# Patient Record
Sex: FEMALE | Race: WHITE | NOT HISPANIC OR LATINO | Employment: STUDENT | URBAN - METROPOLITAN AREA
[De-identification: names, ages, dates, MRNs, and addresses within clinical notes are randomized per-mention and may not be internally consistent; named-entity substitution may affect disease eponyms.]

---

## 2017-05-25 ENCOUNTER — ALLSCRIPTS OFFICE VISIT (OUTPATIENT)
Dept: OTHER | Facility: OTHER | Age: 22
End: 2017-05-25

## 2017-05-25 ENCOUNTER — TRANSCRIBE ORDERS (OUTPATIENT)
Dept: ADMINISTRATIVE | Facility: HOSPITAL | Age: 22
End: 2017-05-25

## 2017-05-25 DIAGNOSIS — M25.552 PAIN IN LEFT HIP: ICD-10-CM

## 2017-05-25 DIAGNOSIS — M25.552 LEFT HIP PAIN: Primary | ICD-10-CM

## 2017-06-06 ENCOUNTER — HOSPITAL ENCOUNTER (OUTPATIENT)
Dept: RADIOLOGY | Facility: HOSPITAL | Age: 22
Discharge: HOME | End: 2017-06-06
Payer: COMMERCIAL

## 2017-06-06 DIAGNOSIS — M25.552 LEFT HIP PAIN: ICD-10-CM

## 2017-06-06 DIAGNOSIS — M25.552 PAIN IN LEFT HIP: ICD-10-CM

## 2017-06-06 PROCEDURE — A9585 GADOBUTROL INJECTION: HCPCS | Performed by: RADIOLOGY

## 2017-06-06 PROCEDURE — 73722 MRI JOINT OF LWR EXTR W/DYE: CPT

## 2017-06-06 PROCEDURE — 27095 INJECTION FOR HIP X-RAY: CPT

## 2017-06-06 PROCEDURE — A9585 GADOBUTROL INJECTION: HCPCS | Performed by: PHYSICIAN ASSISTANT

## 2017-06-06 PROCEDURE — 77002 NEEDLE LOCALIZATION BY XRAY: CPT

## 2017-06-06 RX ORDER — LIDOCAINE HYDROCHLORIDE 10 MG/ML
20 INJECTION, SOLUTION INFILTRATION; PERINEURAL
Status: COMPLETED | OUTPATIENT
Start: 2017-06-06 | End: 2017-06-06

## 2017-06-06 RX ORDER — 0.9 % SODIUM CHLORIDE 0.9 %
50 VIAL (ML) INJECTION
Status: COMPLETED | OUTPATIENT
Start: 2017-06-06 | End: 2017-06-06

## 2017-06-06 RX ORDER — SODIUM BICARBONATE 42 MG/ML
5 INJECTION, SOLUTION INTRAVENOUS
Status: COMPLETED | OUTPATIENT
Start: 2017-06-06 | End: 2017-06-06

## 2017-06-06 RX ADMIN — IOHEXOL 3 ML: 300 INJECTION, SOLUTION INTRAVENOUS at 15:15

## 2017-06-06 RX ADMIN — SODIUM BICARBONATE 1 ML: 42 SOLUTION INTRAVENOUS at 15:15

## 2017-06-06 RX ADMIN — LIDOCAINE HYDROCHLORIDE 2 ML: 10 INJECTION, SOLUTION INFILTRATION; PERINEURAL at 15:15

## 2017-06-06 RX ADMIN — GADOBUTROL 0.2 ML: 604.72 INJECTION INTRAVENOUS at 15:15

## 2017-06-06 RX ADMIN — GADOBUTROL 0.1 ML: 604.72 INJECTION INTRAVENOUS at 03:15

## 2017-06-06 RX ADMIN — SODIUM CHLORIDE 50 ML: 9 INJECTION, SOLUTION INTRAMUSCULAR; INTRAVENOUS; SUBCUTANEOUS at 15:15

## 2017-06-21 ENCOUNTER — ALLSCRIPTS OFFICE VISIT (OUTPATIENT)
Dept: OTHER | Facility: OTHER | Age: 22
End: 2017-06-21

## 2017-06-28 ENCOUNTER — GENERIC CONVERSION - ENCOUNTER (OUTPATIENT)
Dept: OTHER | Facility: OTHER | Age: 22
End: 2017-06-28

## 2017-07-18 ENCOUNTER — ALLSCRIPTS OFFICE VISIT (OUTPATIENT)
Dept: OTHER | Facility: OTHER | Age: 22
End: 2017-07-18

## 2017-08-08 ENCOUNTER — ALLSCRIPTS OFFICE VISIT (OUTPATIENT)
Dept: OTHER | Facility: OTHER | Age: 22
End: 2017-08-08

## 2017-08-15 RX ORDER — LEVOTHYROXINE SODIUM 0.03 MG/1
25 TABLET ORAL
COMMUNITY
Start: 2016-06-07

## 2017-08-15 RX ORDER — DROSPIRENONE/ETHINYL ESTRADIOL/LEVOMEFOLATE CALCIUM AND LEVOMEFOLATE CALCIUM 3-0.03(21)
KIT ORAL
COMMUNITY
End: 2021-01-04 | Stop reason: SDUPTHER

## 2017-08-17 ENCOUNTER — ANESTHESIA (OUTPATIENT)
Dept: PERIOP | Facility: HOSPITAL | Age: 22
End: 2017-08-17
Payer: COMMERCIAL

## 2017-08-17 ENCOUNTER — HOSPITAL ENCOUNTER (OUTPATIENT)
Facility: HOSPITAL | Age: 22
Setting detail: OUTPATIENT SURGERY
Discharge: HOME | End: 2017-08-17
Attending: ORTHOPAEDIC SURGERY | Admitting: ORTHOPAEDIC SURGERY
Payer: COMMERCIAL

## 2017-08-17 ENCOUNTER — APPOINTMENT (OUTPATIENT)
Dept: RADIOLOGY | Facility: HOSPITAL | Age: 22
End: 2017-08-17
Payer: COMMERCIAL

## 2017-08-17 ENCOUNTER — ANESTHESIA EVENT (OUTPATIENT)
Dept: PERIOP | Facility: HOSPITAL | Age: 22
End: 2017-08-17
Payer: COMMERCIAL

## 2017-08-17 VITALS
HEIGHT: 65 IN | OXYGEN SATURATION: 98 % | WEIGHT: 125 LBS | DIASTOLIC BLOOD PRESSURE: 61 MMHG | SYSTOLIC BLOOD PRESSURE: 111 MMHG | TEMPERATURE: 97.2 F | RESPIRATION RATE: 16 BRPM | BODY MASS INDEX: 20.83 KG/M2 | HEART RATE: 82 BPM

## 2017-08-17 LAB — EXT PREGNANCY TEST URINE: NEGATIVE

## 2017-08-17 PROCEDURE — 73501 X-RAY EXAM HIP UNI 1 VIEW: CPT

## 2017-08-17 PROCEDURE — 81025 URINE PREGNANCY TEST: CPT | Performed by: ORTHOPAEDIC SURGERY

## 2017-08-17 PROCEDURE — C1713 ANCHOR/SCREW BN/BN,TIS/BN: HCPCS | Performed by: ORTHOPAEDIC SURGERY

## 2017-08-17 DEVICE — OSTEORAPTOR 2.3 SUTURE ANCHOR WITH                                    ONE ULTRABRAID NO.2 SUTURE COBRAID BLACK
Type: IMPLANTABLE DEVICE | Site: HIP | Status: FUNCTIONAL
Brand: OSTEORAPTOR

## 2017-08-17 RX ORDER — KETAMINE HYDROCHLORIDE 50 MG/ML
INJECTION, SOLUTION, CONCENTRATE INTRAMUSCULAR; INTRAVENOUS AS NEEDED
Status: DISCONTINUED | OUTPATIENT
Start: 2017-08-17 | End: 2017-08-17 | Stop reason: SURG

## 2017-08-17 RX ORDER — ACETAMINOPHEN 325 MG/1
650 TABLET ORAL EVERY 4 HOURS PRN
Status: DISCONTINUED | OUTPATIENT
Start: 2017-08-17 | End: 2017-08-17 | Stop reason: HOSPADM

## 2017-08-17 RX ORDER — ONDANSETRON 2 MG/ML
4 INJECTION INTRAMUSCULAR; INTRAVENOUS EVERY 6 HOURS PRN
Status: DISCONTINUED | OUTPATIENT
Start: 2017-08-17 | End: 2017-08-17 | Stop reason: HOSPADM

## 2017-08-17 RX ORDER — KETOROLAC TROMETHAMINE 30 MG/ML
INJECTION, SOLUTION INTRAMUSCULAR; INTRAVENOUS AS NEEDED
Status: DISCONTINUED | OUTPATIENT
Start: 2017-08-17 | End: 2017-08-17 | Stop reason: SURG

## 2017-08-17 RX ORDER — ONDANSETRON 2 MG/ML
INJECTION INTRAMUSCULAR; INTRAVENOUS AS NEEDED
Status: DISCONTINUED | OUTPATIENT
Start: 2017-08-17 | End: 2017-08-17 | Stop reason: SURG

## 2017-08-17 RX ORDER — ACETAMINOPHEN 325 MG/1
975 TABLET ORAL ONCE
Status: COMPLETED | OUTPATIENT
Start: 2017-08-17 | End: 2017-08-17

## 2017-08-17 RX ORDER — OXYCODONE HYDROCHLORIDE AND ACETAMINOPHEN 5; 325 MG/1; MG/1
2 TABLET ORAL EVERY 4 HOURS PRN
Status: DISCONTINUED | OUTPATIENT
Start: 2017-08-17 | End: 2017-08-17 | Stop reason: HOSPADM

## 2017-08-17 RX ORDER — LIDOCAINE HYDROCHLORIDE 10 MG/ML
INJECTION, SOLUTION INFILTRATION; PERINEURAL AS NEEDED
Status: DISCONTINUED | OUTPATIENT
Start: 2017-08-17 | End: 2017-08-17 | Stop reason: SURG

## 2017-08-17 RX ORDER — METOCLOPRAMIDE HYDROCHLORIDE 5 MG/ML
10 INJECTION INTRAMUSCULAR; INTRAVENOUS ONCE AS NEEDED
Status: DISCONTINUED | OUTPATIENT
Start: 2017-08-17 | End: 2017-08-17 | Stop reason: HOSPADM

## 2017-08-17 RX ORDER — FENTANYL CITRATE/PF 50 MCG/ML
50 SYRINGE (ML) INJECTION
Status: DISCONTINUED | OUTPATIENT
Start: 2017-08-17 | End: 2017-08-17 | Stop reason: HOSPADM

## 2017-08-17 RX ORDER — FENTANYL CITRATE 50 UG/ML
INJECTION, SOLUTION INTRAMUSCULAR; INTRAVENOUS AS NEEDED
Status: DISCONTINUED | OUTPATIENT
Start: 2017-08-17 | End: 2017-08-17 | Stop reason: SURG

## 2017-08-17 RX ORDER — PREGABALIN 100 MG/1
100 CAPSULE ORAL ONCE
Status: COMPLETED | OUTPATIENT
Start: 2017-08-17 | End: 2017-08-17

## 2017-08-17 RX ORDER — PROMETHAZINE HYDROCHLORIDE 25 MG/ML
25 INJECTION, SOLUTION INTRAMUSCULAR; INTRAVENOUS ONCE AS NEEDED
Status: DISCONTINUED | OUTPATIENT
Start: 2017-08-17 | End: 2017-08-17 | Stop reason: HOSPADM

## 2017-08-17 RX ORDER — SODIUM CHLORIDE, SODIUM LACTATE, POTASSIUM CHLORIDE, CALCIUM CHLORIDE 600; 310; 30; 20 MG/100ML; MG/100ML; MG/100ML; MG/100ML
INJECTION, SOLUTION INTRAVENOUS CONTINUOUS PRN
Status: DISCONTINUED | OUTPATIENT
Start: 2017-08-17 | End: 2017-08-17 | Stop reason: SURG

## 2017-08-17 RX ORDER — DEXMEDETOMIDINE HYDROCHLORIDE 100 UG/ML
INJECTION, SOLUTION INTRAVENOUS AS NEEDED
Status: DISCONTINUED | OUTPATIENT
Start: 2017-08-17 | End: 2017-08-17 | Stop reason: SURG

## 2017-08-17 RX ORDER — SODIUM CHLORIDE, SODIUM LACTATE, POTASSIUM CHLORIDE, CALCIUM CHLORIDE 600; 310; 30; 20 MG/100ML; MG/100ML; MG/100ML; MG/100ML
100 INJECTION, SOLUTION INTRAVENOUS CONTINUOUS
Status: DISCONTINUED | OUTPATIENT
Start: 2017-08-17 | End: 2017-08-17 | Stop reason: HOSPADM

## 2017-08-17 RX ORDER — OXYCODONE HYDROCHLORIDE AND ACETAMINOPHEN 5; 325 MG/1; MG/1
TABLET ORAL
Qty: 60 TABLET | Refills: 0 | Status: SHIPPED | OUTPATIENT
Start: 2017-08-17

## 2017-08-17 RX ORDER — MIDAZOLAM HYDROCHLORIDE 1 MG/ML
INJECTION INTRAMUSCULAR; INTRAVENOUS AS NEEDED
Status: DISCONTINUED | OUTPATIENT
Start: 2017-08-17 | End: 2017-08-17 | Stop reason: SURG

## 2017-08-17 RX ORDER — DIPHENHYDRAMINE HYDROCHLORIDE 50 MG/ML
12.5 INJECTION INTRAMUSCULAR; INTRAVENOUS ONCE AS NEEDED
Status: DISCONTINUED | OUTPATIENT
Start: 2017-08-17 | End: 2017-08-17 | Stop reason: HOSPADM

## 2017-08-17 RX ORDER — EPHEDRINE SULFATE 50 MG/ML
INJECTION, SOLUTION INTRAVENOUS AS NEEDED
Status: DISCONTINUED | OUTPATIENT
Start: 2017-08-17 | End: 2017-08-17 | Stop reason: SURG

## 2017-08-17 RX ORDER — MEPERIDINE HYDROCHLORIDE 25 MG/ML
12.5 INJECTION INTRAMUSCULAR; INTRAVENOUS; SUBCUTANEOUS ONCE AS NEEDED
Status: DISCONTINUED | OUTPATIENT
Start: 2017-08-17 | End: 2017-08-17 | Stop reason: HOSPADM

## 2017-08-17 RX ORDER — MORPHINE SULFATE 2 MG/ML
2 INJECTION, SOLUTION INTRAMUSCULAR; INTRAVENOUS EVERY 2 HOUR PRN
Status: DISCONTINUED | OUTPATIENT
Start: 2017-08-17 | End: 2017-08-17 | Stop reason: HOSPADM

## 2017-08-17 RX ORDER — PROPOFOL 10 MG/ML
INJECTION, EMULSION INTRAVENOUS AS NEEDED
Status: DISCONTINUED | OUTPATIENT
Start: 2017-08-17 | End: 2017-08-17 | Stop reason: SURG

## 2017-08-17 RX ADMIN — DEXAMETHASONE SODIUM PHOSPHATE 10 MG: 10 INJECTION INTRAMUSCULAR; INTRAVENOUS at 11:25

## 2017-08-17 RX ADMIN — CEFAZOLIN SODIUM 1000 MG: 1 SOLUTION INTRAVENOUS at 11:25

## 2017-08-17 RX ADMIN — DEXMEDETOMIDINE HYDROCHLORIDE 4 MCG: 100 INJECTION, SOLUTION INTRAVENOUS at 11:29

## 2017-08-17 RX ADMIN — DEXMEDETOMIDINE HYDROCHLORIDE 4 MCG: 100 INJECTION, SOLUTION INTRAVENOUS at 11:37

## 2017-08-17 RX ADMIN — PROPOFOL 200 MG: 10 INJECTION, EMULSION INTRAVENOUS at 11:18

## 2017-08-17 RX ADMIN — EPHEDRINE SULFATE 10 MG: 50 INJECTION, SOLUTION INTRAMUSCULAR; INTRAVENOUS; SUBCUTANEOUS at 11:55

## 2017-08-17 RX ADMIN — PREGABALIN 100 MG: 100 CAPSULE ORAL at 10:43

## 2017-08-17 RX ADMIN — DEXMEDETOMIDINE HYDROCHLORIDE 4 MCG: 100 INJECTION, SOLUTION INTRAVENOUS at 11:41

## 2017-08-17 RX ADMIN — FENTANYL CITRATE 50 MCG: 50 INJECTION INTRAMUSCULAR; INTRAVENOUS at 13:02

## 2017-08-17 RX ADMIN — LIDOCAINE HYDROCHLORIDE 50 MG: 10 INJECTION, SOLUTION INFILTRATION; PERINEURAL at 11:18

## 2017-08-17 RX ADMIN — DEXMEDETOMIDINE HYDROCHLORIDE 4 MCG: 100 INJECTION, SOLUTION INTRAVENOUS at 11:32

## 2017-08-17 RX ADMIN — OXYCODONE HYDROCHLORIDE AND ACETAMINOPHEN 2 TABLET: 5; 325 TABLET ORAL at 13:22

## 2017-08-17 RX ADMIN — MIDAZOLAM HYDROCHLORIDE 2 MG: 1 INJECTION, SOLUTION INTRAMUSCULAR; INTRAVENOUS at 10:55

## 2017-08-17 RX ADMIN — ONDANSETRON 4 MG: 2 INJECTION INTRAMUSCULAR; INTRAVENOUS at 12:14

## 2017-08-17 RX ADMIN — DEXMEDETOMIDINE HYDROCHLORIDE 4 MCG: 100 INJECTION, SOLUTION INTRAVENOUS at 11:31

## 2017-08-17 RX ADMIN — DEXMEDETOMIDINE HYDROCHLORIDE 4 MCG: 100 INJECTION, SOLUTION INTRAVENOUS at 11:27

## 2017-08-17 RX ADMIN — KETAMINE HYDROCHLORIDE 30 MG: 50 INJECTION INTRAMUSCULAR; INTRAVENOUS at 11:30

## 2017-08-17 RX ADMIN — FENTANYL CITRATE 25 MCG: 50 INJECTION INTRAMUSCULAR; INTRAVENOUS at 11:58

## 2017-08-17 RX ADMIN — SODIUM CHLORIDE, SODIUM LACTATE, POTASSIUM CHLORIDE, AND CALCIUM CHLORIDE: .6; .31; .03; .02 INJECTION, SOLUTION INTRAVENOUS at 10:40

## 2017-08-17 RX ADMIN — ACETAMINOPHEN 975 MG: 325 TABLET ORAL at 10:43

## 2017-08-17 RX ADMIN — DEXMEDETOMIDINE HYDROCHLORIDE 4 MCG: 100 INJECTION, SOLUTION INTRAVENOUS at 11:35

## 2017-08-17 RX ADMIN — KETOROLAC TROMETHAMINE 30 MG: 30 INJECTION, SOLUTION INTRAMUSCULAR at 12:18

## 2017-08-29 ENCOUNTER — ALLSCRIPTS OFFICE VISIT (OUTPATIENT)
Dept: OTHER | Facility: OTHER | Age: 22
End: 2017-08-29

## 2017-08-31 DIAGNOSIS — S73.192D OTHER SPRAIN OF LEFT HIP, SUBSEQUENT ENCOUNTER: ICD-10-CM

## 2017-09-01 ENCOUNTER — GENERIC CONVERSION - ENCOUNTER (OUTPATIENT)
Dept: OTHER | Facility: OTHER | Age: 22
End: 2017-09-01

## 2017-09-25 ENCOUNTER — GENERIC CONVERSION - ENCOUNTER (OUTPATIENT)
Dept: OTHER | Facility: OTHER | Age: 22
End: 2017-09-25

## 2017-09-27 ENCOUNTER — GENERIC CONVERSION - ENCOUNTER (OUTPATIENT)
Dept: OTHER | Facility: OTHER | Age: 22
End: 2017-09-27

## 2017-10-23 ENCOUNTER — GENERIC CONVERSION - ENCOUNTER (OUTPATIENT)
Dept: OTHER | Facility: OTHER | Age: 22
End: 2017-10-23

## 2017-11-24 ENCOUNTER — ALLSCRIPTS OFFICE VISIT (OUTPATIENT)
Dept: OTHER | Facility: OTHER | Age: 22
End: 2017-11-24

## 2017-11-25 NOTE — PROGRESS NOTES
Assessment    1  Tear of left acetabular labrum, subsequent encounter (K48 05,173 7) (O31 216B)    Discussion/Summary    Left hip arthroscopic labral repair 8/17/17  resume full activity as tolerated, modify as needed  Follow-up in 4-6 weeks for clearance to resume lacrosse in January if she continues to do well  Chief Complaint  1  Hip Pain    Post-Op  HPI: Follow-up after left hip arthroscopic labral repair 8/17/17  Patient reports no pain at this time  She has completed physical therapy and recently resumed running 1 mi with no significant issues  She has not attempted any sprinting or cutting activities but has been doing strengthening exercises with steady progress  Active Problems  1  Tear of left acetabular labrum, subsequent encounter (V58 89,843 8) (B90 242W)    Social History   · Never a smoker    Current Meds   1  Safyral TABS (Drospiren-Eth Estrad-Levomefol) Recorded   2  Synthroid TABS (Levothyroxine Sodium) Recorded    Allergies  1  Gentamicin Sulfate OINT   2  Sulfacetamide Sodium OINT    Vitals   Recorded: [id number] 09:08AM   Heart Rate 82   Systolic 380   Diastolic 75   Height 5 ft 3 in   Weight 126 lb    BMI Calculated 22 32   BSA Calculated 1 59       Physical Exam  Left hip:  No gross deformity  Full range of motion  Impingement test is negative  REID test is negative  Straight leg raise against resistance is negative  Passive supine rotation test is negative  No lower extremity edema  Mood and affect are appropriate        Signatures   Electronically signed by : SOFIE Melchor ; Nov 24 2017  9:21AM EST                       (Author)

## 2018-01-03 ENCOUNTER — ALLSCRIPTS OFFICE VISIT (OUTPATIENT)
Dept: OTHER | Facility: OTHER | Age: 23
End: 2018-01-03

## 2018-01-04 NOTE — PROGRESS NOTES
Assessment   1  Tear of left acetabular labrum, subsequent encounter (V58 [id number] 8) (S73 256D)    Discussion/Summary      Cleared to return to sports without restrictions  as needed  Chief Complaint   1  Hip Pain    History of Present Illness   HPI: Follow-up left hip after arthroscopic labral repair follow-up left hip after arthroscopic labral repair 8/17/17  Patient reports no pain at this time  She notes soreness occasionally her workouts  She has transition to home exercise program at this point  Active Problems   1  Tear of left acetabular labrum, subsequent encounter (V58 89,843 8) (F89 519Z)    Social History    · Never a smoker    Current Meds    1  Safyral TABS (Drospiren-Eth Estrad-Levomefol) Recorded   2  Synthroid TABS (Levothyroxine Sodium) Recorded    Allergies   1  Gentamicin Sulfate OINT   2  Sulfacetamide Sodium OINT    Vitals    Recorded: [id number] 08:32AM   Heart Rate 78   Systolic 966   Diastolic 76   Height 5 ft 3 in   Weight 125 lb    BMI Calculated 22 14   BSA Calculated 1 59     Physical Exam   Left hip:  [No gross deformity]  [Nontender to palpation]  Range of motion is [symmetric bilaterally]  FADDIR test is [negative]  REID test is [negative]  Passive supine rotation test is [negative]  Straight leg raise against resistance is [negative]  No lower extremity edema  Mood and affect are appropriate           Signatures    Electronically signed by : SOFIE Slater ; Joni  3 2018  8:49AM EST                       (Author)

## 2018-01-13 VITALS
WEIGHT: 127.38 LBS | DIASTOLIC BLOOD PRESSURE: 75 MMHG | HEIGHT: 63 IN | SYSTOLIC BLOOD PRESSURE: 119 MMHG | BODY MASS INDEX: 22.57 KG/M2 | HEART RATE: 116 BPM

## 2018-01-14 VITALS
HEART RATE: 64 BPM | WEIGHT: 128.13 LBS | SYSTOLIC BLOOD PRESSURE: 104 MMHG | DIASTOLIC BLOOD PRESSURE: 70 MMHG | RESPIRATION RATE: 18 BRPM

## 2018-01-14 VITALS
HEIGHT: 63 IN | DIASTOLIC BLOOD PRESSURE: 75 MMHG | WEIGHT: 128.13 LBS | BODY MASS INDEX: 22.7 KG/M2 | RESPIRATION RATE: 18 BRPM | HEART RATE: 63 BPM | SYSTOLIC BLOOD PRESSURE: 118 MMHG

## 2018-01-14 VITALS
DIASTOLIC BLOOD PRESSURE: 75 MMHG | SYSTOLIC BLOOD PRESSURE: 118 MMHG | HEART RATE: 82 BPM | BODY MASS INDEX: 22.32 KG/M2 | WEIGHT: 126 LBS | HEIGHT: 63 IN

## 2018-01-14 VITALS
SYSTOLIC BLOOD PRESSURE: 108 MMHG | HEART RATE: 69 BPM | BODY MASS INDEX: 22.57 KG/M2 | WEIGHT: 127.38 LBS | DIASTOLIC BLOOD PRESSURE: 69 MMHG | HEIGHT: 63 IN

## 2018-01-15 VITALS
WEIGHT: 124 LBS | DIASTOLIC BLOOD PRESSURE: 77 MMHG | SYSTOLIC BLOOD PRESSURE: 122 MMHG | BODY MASS INDEX: 21.97 KG/M2 | HEART RATE: 84 BPM | HEIGHT: 63 IN

## 2018-01-22 VITALS
WEIGHT: 127 LBS | DIASTOLIC BLOOD PRESSURE: 78 MMHG | HEART RATE: 86 BPM | BODY MASS INDEX: 22.5 KG/M2 | SYSTOLIC BLOOD PRESSURE: 115 MMHG | HEIGHT: 63 IN

## 2018-01-23 VITALS
WEIGHT: 125 LBS | HEIGHT: 63 IN | BODY MASS INDEX: 22.15 KG/M2 | HEART RATE: 78 BPM | DIASTOLIC BLOOD PRESSURE: 76 MMHG | SYSTOLIC BLOOD PRESSURE: 123 MMHG

## 2018-01-23 NOTE — MISCELLANEOUS
Message  Return to work or school:   Jose Heart is under my professional care  She was seen in my office on 1/3/18  She is cleared to return to sports without restrictions          Hieu Chaves MD       Signatures   Electronically signed by : SOFIE Horne ; Joni  3 2018  8:50AM EST                       (Author)

## 2020-12-30 ENCOUNTER — TELEPHONE (OUTPATIENT)
Dept: OBGYN CLINIC | Facility: CLINIC | Age: 25
End: 2020-12-30

## 2021-01-04 DIAGNOSIS — Z30.41 ORAL CONTRACEPTIVE PILL SURVEILLANCE: Primary | ICD-10-CM

## 2021-01-04 RX ORDER — DROSPIRENONE/ETHINYL ESTRADIOL/LEVOMEFOLATE CALCIUM AND LEVOMEFOLATE CALCIUM 3-0.03(21)
1 KIT ORAL DAILY
Qty: 28 TABLET | Refills: 2 | Status: SHIPPED | OUTPATIENT
Start: 2021-01-04

## 2021-01-04 NOTE — TELEPHONE ENCOUNTER
Pt returned my call in regards to her birth control and states she does need refills, pended 3 months for her  She is currently in [place] and trying to find a GYN there

## 2025-04-09 ENCOUNTER — HOSPITAL ENCOUNTER (OUTPATIENT)
Facility: CLINIC | Age: 30
Discharge: HOME | End: 2025-04-09
Attending: FAMILY MEDICINE
Payer: COMMERCIAL

## 2025-04-09 VITALS
DIASTOLIC BLOOD PRESSURE: 56 MMHG | HEART RATE: 81 BPM | SYSTOLIC BLOOD PRESSURE: 110 MMHG | OXYGEN SATURATION: 98 % | TEMPERATURE: 97.9 F | WEIGHT: 140 LBS

## 2025-04-09 DIAGNOSIS — R19.7 DIARRHEA, UNSPECIFIED TYPE: ICD-10-CM

## 2025-04-09 DIAGNOSIS — J02.9 ACUTE PHARYNGITIS, UNSPECIFIED ETIOLOGY: Primary | ICD-10-CM

## 2025-04-09 LAB
EXPIRATION DATE: NORMAL
Lab: NORMAL
POCT MANUFACTURER: NORMAL
S PYO AG THROAT QL: NEGATIVE

## 2025-04-09 PROCEDURE — 99203 OFFICE O/P NEW LOW 30 MIN: CPT

## 2025-04-09 PROCEDURE — 87880 STREP A ASSAY W/OPTIC: CPT

## 2025-04-09 PROCEDURE — S9088 SERVICES PROVIDED IN URGENT: HCPCS

## 2025-04-09 ASSESSMENT — ENCOUNTER SYMPTOMS
LIGHT-HEADEDNESS: 0
WHEEZING: 0
FEVER: 0
HEADACHES: 0
CONSTIPATION: 0
SHORTNESS OF BREATH: 0
SORE THROAT: 1
DIZZINESS: 0
CHEST TIGHTNESS: 0
BLOOD IN STOOL: 0
ABDOMINAL PAIN: 0
DIARRHEA: 1
MYALGIAS: 0
COUGH: 0
PALPITATIONS: 0
CHILLS: 1
NAUSEA: 0
VOMITING: 0
FATIGUE: 0
RHINORRHEA: 0

## 2025-04-09 NOTE — ED ATTESTATION NOTE
I was immediately available to provide supervision and direction for the care of the patient.  I agree with the plan based on the documented history and physical findings.       Campos Maravilla MD  04/09/25 8748

## 2025-04-09 NOTE — ED PROVIDER NOTES
Emergency Medicine Note  HPI   HISTORY OF PRESENT ILLNESS     Chief complaint: diarrhea  Onset: 4-5 days  Pt denies changes in food/diet. Had some cramping on day 1, since resolved.   Characteristics: pt can keep food/fluids down.   Accompanying symptoms: some bloating  Aggravating Factors: eating  Relieving Factors: none identified  Treatments attempted: none  Denies fever, N/V, blood in stool, black/tarry stool, constipation, rectal pain, acid reflux, sour/upset stomach, back pain, abdominal pain.  Hx thyroidectomy, has been on current levothyroxine dose x 2 months.     Chief complaint: sore throat  Onset: x 1 day  Aggravating Factors: none identified  Relieving Factors: cough drops  Treatments attempted: lozenges, tylenol w/ minimal relief  Denies trismus, difficulty/trouble swallowing, pain on one side of the throat, drooling, hoarseness or vocal changes, fever, cough.              Patient History   PAST HISTORY     Reviewed from Nursing Triage:  Tobacco  Allergies  Meds  Problems  Med Hx  Surg Hx  Fam Hx  Soc   Hx      History reviewed. No pertinent past medical history.    No past surgical history on file.    No family history on file.           Review of Systems   REVIEW OF SYSTEMS     Review of Systems   Constitutional:  Positive for chills. Negative for fatigue and fever.   HENT:  Positive for sore throat. Negative for congestion, postnasal drip and rhinorrhea.    Respiratory:  Negative for cough, chest tightness, shortness of breath and wheezing.    Cardiovascular:  Negative for chest pain and palpitations.   Gastrointestinal:  Positive for diarrhea. Negative for abdominal pain, blood in stool, constipation, nausea and vomiting.   Musculoskeletal:  Negative for myalgias.   Neurological:  Negative for dizziness, light-headedness and headaches.         VITALS     ED Vitals      Date/Time Temp Pulse Resp BP SpO2 [facility]   04/09/25 0819 36.6 °C (97.9 °F) 81 -- 110/56 98 % DM                         Physical  Exam   PHYSICAL EXAM     Physical Exam  Constitutional:       General: She is not in acute distress.     Appearance: She is not toxic-appearing.   HENT:      Mouth/Throat:      Pharynx: Posterior oropharyngeal erythema present. No pharyngeal swelling or postnasal drip.      Tonsils: No tonsillar exudate. 0 on the right. 0 on the left.   Abdominal:      General: Bowel sounds are normal. There is no distension.      Palpations: Abdomen is soft.      Tenderness: There is no abdominal tenderness. There is no guarding or rebound.   Lymphadenopathy:      Cervical: No cervical adenopathy.   Neurological:      General: No focal deficit present.      Mental Status: She is alert and oriented to person, place, and time. Mental status is at baseline.   Psychiatric:         Mood and Affect: Mood normal.         Behavior: Behavior normal.         Thought Content: Thought content normal.           PROCEDURES     Procedures     DATA     Results       None                No orders to display       Scoring tools                                  ED Course & MDM   MDM / ED COURSE / CLINICAL IMPRESSION / DISPO     Medical Decision Making  Pt complains of sore throat and diarrhea  Physical exam: abd soft/nontender, throat erythematous, no lymphadenopathy/exudate  Rapid strep negative, throat culture pending.   Recommend OTC immodium, pepcid. F/u with PCP for further evaluation. ER for worsening symptoms.   Pt educated on rx meds, supportive care, f/u, and s/s red flags for emergency treatment. Pt agreeable to plan.            Clinical Impression      None                 Ilda Bonilla CRNP  04/09/25 0892

## 2025-04-09 NOTE — DISCHARGE INSTRUCTIONS
"Primary Care Provider - routine management of preventative care and medical concerns.     Please establish care with a Primary Care Provider for treatment and management of your ongoing medical concern.   Go to: [email or web address] to schedule follow up with a Primary Care Provider.   (Or Scan the QR code, then:)  Click \"Get Care\" in the upper right hand corner.  Then click Schedule Online (under Primary Care).     (For sooner appointments, you may be interested in seeking care at [facility]- [phone] or [facility]- [phone])  ------     Your rapid strep was negative.    We will send a throat culture to the lab.     Your results will also be available for you to view in your [facility] account.  If you require additional medications (antibiotics, antivirals, etc.), we will call you regarding your test results.  Please note: certain test results and certain agencies ([facility]) may take longer to result or receive results than others.  ·        Viral swabs (flu, covid, RSV): approximately 1-2 days  ·        Strep throat cultures: approximately 2-3 days  ·        Wound cultures: approximately 2-3 days  ·        Urine cultures: approximately 2-3 days    -------------------------------      Care Instructions:  Get plenty of rest to help your body fight the infection.  Drink plenty of fluids and stay well-hydrated.  Take acetaminophen (Tylenol) and/or ibuprofen (Advil or Motrin) as needed for fever and pain.  You may use a cool-mist humidifier to add moisture to the air. This may help soothe a sore throat.     Gargling with salt water may help alleviate some of your symptoms:  Mix 1 teaspoon of salt with 8 ounces of warm water. Allow salt to completely dissolve. Gargle with this solution for a few seconds, then spit. Repeat as needed. When finished, rinse your mouth with fresh water and spit. Do this several times a day as needed.  This will help clear post-nasal drip " and mucus, wash out bacteria and viruses, reduce inflammation, and relieve pain. It may also help to inhibit bacterial and viral growth.    Remember to wash your hands (with soap and water) well before and after, as this will help limit the spread of infection.    Please change your toothbrush. I recommend using an inexpensive toothbrush for a few days before using your normal-brand toothbrush.      Follow-up:  Please follow up with your primary care provider or with Urgent Care as needed if your symptoms do not improve or worsen.  Please seek emergency care at the ER if you experience (including but not limited to) a new or higher fever, a fever with a stiff neck or severe headache, new or worsening trouble/difficulty swallowing, pain that becomes much worse on one side of your throat than the other, or you are not getting better after 2 days (48 hours) after beginning an antibiotic.    ----------------------------------------------     Acid Reflux (Heartburn)  Some symptoms of acid reflux can be subtle, such as morning symptoms (sour taste, sore throat), a cough that wont go away, foreign sensation in the throat, or fullness in the chest. Chronic acid reflux can cause long-term medical problems.     Medication Regimen:  You can try Pepcid (famotidine) - one tablet by mouth at bedtime. This is available for purchase over the counter.  If you do not wish to take a medication daily for acid reflux, you can try taking Pepcid (famotidine) only as needed. Take Pepcid about 30 minutes to 1 hour before eating or drinking a known trigger.     You may also try Prilosec (omeprazole) - 1 tablet by mouth at bedtime. This is available to purchase over the counter.  If your symptoms persist after one week, please follow up with your primary care provider for further evaluation and management of acid reflux as this may indicate an underlying stomach problem. Please notify your primary care provider if you add an OTC medicine to  your daily routine.     Care instructions:  Avoid lying down after eating. Wait at least 2-3 hours after eating before lying down.  If acid reflux is a problem at night, elevate your head or use an extra pillow at night.  Eat smaller, more frequent meals.  Avoid clothing that is too tight.  Stop smoking. Smoking can contribute and/or cause acid reflux.  Avoid certain foods. Some foods that are known to cause acid reflux are fatty foods, acidic foods (like citrus fruit, tomatoes, pineapples), spicy foods, chocolate, caffeinated beverages, alcohol, carbonated drinks, and peppermint or spearmint.     Follow-up:  If acid reflux becomes a regular concern for you, keep a diary of foods that are associated with your symptoms and change your eating habits or follow-up with your primary care provider for further evaluation and management.  Please seek medical care at your nearest hospital emergency department if you experience severe pain in your chest (or have radiation into your neck, back, left arm, or jaw), have difficulty breathing, or have severe shortness of breath or wheezing.    --------------------------------------------     Diarrhea     Care Instructions:  Stay hydrated by drinking clear liquids like water, broth, or electrolyte drinks. You can do this in frequent, small volumes if you cannot tolerate drinking large volumes at this time.  Drink fluids that contain water, salt, and sugar to replace lost fluids and electrolytes. Avoid alcohol, carbonated drinks, and caffeine.  Suck on ice chips if you are unable to drink fluids.  Try to eat bland foods (like crackers, toast, or rice) in small, frequent meals.  Foods to avoid: greasy, strongly flavored, or spicy foods. Fruits and vegetables that cause gas (broccoli, beans, and peas). Carbonated drinks. High-sugar foods and drinks (fruit juice, soda, energy drinks). Dairy products if they cause gas or bloating.  Deep breathing exercises can be calming.  Monitor your  urine color. Dark urine can indicate dehydration.  Wash your hands well with soap and water after using the bathroom.     Medication Regimen:  You may try Imodium (loperamide). This is available for purchase over the counter, and will help to stop diarrhea.  Do NOT take this if you are experiencing a fever with your symptoms!     You may take Pepto-Bismol. This is available for purchase over the counter. This will coat the stomach. If you have an infection, this medication has shown some gastrointestinal antimicrobial action. Please do not take this medication if you have an aspirin allergy or sensitivity.      Follow-up:  Please seek medical treatment at the nearest emergency department if you experience stools that are unusual in color or odor, see blood or mucus in your stool, have a high fever, experience severe abdominal pain, have signs of severe dehydration (dry mouth, dizziness, infrequent urination), or have confusion or a severe headache.  Additionally, diarrhea is concerning if: it lasts longer than 2 days in children OR longer than 5 days in adults.    -----------------------------------------

## 2025-04-11 LAB — S PYO THROAT QL CULT: NORMAL

## (undated) DEVICE — TUBING SUCTION 5MM X 12 FT

## (undated) DEVICE — CLEAR TRAC COMPLETE HIP CANNULA 7X110: Brand: CLEAR-TRAC

## (undated) DEVICE — GLOVE SRG BIOGEL 8.5

## (undated) DEVICE — ABDOMINAL PAD: Brand: DERMACEA

## (undated) DEVICE — ARTHROSCOPY FLOOR MAT

## (undated) DEVICE — 6617 IOBAN II PATIENT ISOLATION DRAPE 5/BX,4BX/CS: Brand: STERI-DRAPE™ IOBAN™ 2

## (undated) DEVICE — LIGHT GLOVE GREEN

## (undated) DEVICE — INTENDED FOR TISSUE SEPARATION, AND OTHER PROCEDURES THAT REQUIRE A SHARP SURGICAL BLADE TO PUNCTURE OR CUT.: Brand: BARD-PARKER ® CARBON RIB-BACK BLADES

## (undated) DEVICE — SUT MONOCRYL 3-0 PS-2 18 IN Y497G

## (undated) DEVICE — DISPOSABLE HIB PAC INCLUDES 3                                    GUIDEWIRES AND 2 ARTHROSCOPY NEEDLES

## (undated) DEVICE — PAD CAST 6 IN COTTON NON STERILE

## (undated) DEVICE — PACK UNIVERSAL ARTHRSCOPY PBDS

## (undated) DEVICE — 3M™ MICROFOAM™ SURGICAL TAPE 4 ROLLS/CARTON 6 CARTONS/CASE 1528-3: Brand: 3M™ MICROFOAM™

## (undated) DEVICE — VAPR COOLPULSE 90 ELECTRODE 90 DEGREES SUCTION WITH INTEGRATED HANDPIECE: Brand: VAPR COOLPULSE

## (undated) DEVICE — GLOVE INDICATOR PI UNDERGLOVE SZ 8.5 BLUE

## (undated) DEVICE — SYRINGE 20ML LL

## (undated) DEVICE — TUBING ARTHROSCOPY REDUCE PATIENT

## (undated) DEVICE — PUDDLE VAC

## (undated) DEVICE — DRAPE C-ARM X-RAY

## (undated) DEVICE — LIGHT HANDLE COVER SLEEVE DISP BLUE STELLAR

## (undated) DEVICE — CHLORAPREP HI-LITE 26ML ORANGE

## (undated) DEVICE — 3M™ STERI-STRIP™ REINFORCED ADHESIVE SKIN CLOSURES, R1547, 1/2 IN X 4 IN (12 MM X 100 MM), 6 STRIPS/ENVELOPE: Brand: 3M™ STERI-STRIP™

## (undated) DEVICE — BLADE 4MM BANANA S/SU